# Patient Record
Sex: FEMALE | Race: WHITE | Employment: UNEMPLOYED | URBAN - METROPOLITAN AREA
[De-identification: names, ages, dates, MRNs, and addresses within clinical notes are randomized per-mention and may not be internally consistent; named-entity substitution may affect disease eponyms.]

---

## 2017-02-09 ENCOUNTER — OFFICE VISIT (OUTPATIENT)
Dept: ORTHOPEDIC SURGERY | Age: 52
End: 2017-02-09

## 2017-02-09 VITALS — WEIGHT: 190 LBS | HEIGHT: 66 IN | BODY MASS INDEX: 30.53 KG/M2

## 2017-02-09 DIAGNOSIS — G63 NEUROPATHY ASSOCIATED WITH CANCER (HCC): ICD-10-CM

## 2017-02-09 DIAGNOSIS — C80.1 NEUROPATHY ASSOCIATED WITH CANCER (HCC): ICD-10-CM

## 2017-02-09 DIAGNOSIS — G56.01 CARPAL TUNNEL SYNDROME OF RIGHT WRIST: ICD-10-CM

## 2017-02-09 PROCEDURE — 99243 OFF/OP CNSLTJ NEW/EST LOW 30: CPT | Performed by: ORTHOPAEDIC SURGERY

## 2017-02-09 RX ORDER — AMITRIPTYLINE HYDROCHLORIDE 10 MG/1
TABLET, FILM COATED ORAL
COMMUNITY
Start: 2017-02-06 | End: 2017-03-08

## 2017-02-09 RX ORDER — ANASTROZOLE 1 MG/1
1 TABLET ORAL
COMMUNITY
Start: 2016-12-14 | End: 2017-02-09 | Stop reason: CLARIF

## 2017-02-10 ENCOUNTER — TELEPHONE (OUTPATIENT)
Dept: ORTHOPEDIC SURGERY | Age: 52
End: 2017-02-10

## 2017-02-28 ENCOUNTER — TELEPHONE (OUTPATIENT)
Dept: ORTHOPEDIC SURGERY | Age: 52
End: 2017-02-28

## 2017-03-01 ENCOUNTER — HOSPITAL ENCOUNTER (OUTPATIENT)
Dept: SURGERY | Age: 52
Discharge: HOME | End: 2017-03-01
Attending: ORTHOPAEDIC SURGERY | Admitting: ORTHOPAEDIC SURGERY

## 2017-03-06 ENCOUNTER — TELEPHONE (OUTPATIENT)
Dept: ORTHOPEDIC SURGERY | Age: 52
End: 2017-03-06

## 2017-03-15 ENCOUNTER — HOSPITAL ENCOUNTER (OUTPATIENT)
Dept: SURGERY | Age: 52
Discharge: HOME | End: 2017-03-15
Attending: ORTHOPAEDIC SURGERY | Admitting: ORTHOPAEDIC SURGERY

## 2017-03-15 VITALS
DIASTOLIC BLOOD PRESSURE: 83 MMHG | SYSTOLIC BLOOD PRESSURE: 118 MMHG | TEMPERATURE: 97.8 F | WEIGHT: 190 LBS | HEIGHT: 66 IN | HEART RATE: 69 BPM | BODY MASS INDEX: 30.53 KG/M2 | OXYGEN SATURATION: 98 % | RESPIRATION RATE: 18 BRPM

## 2017-03-15 RX ORDER — SODIUM CHLORIDE, SODIUM LACTATE, POTASSIUM CHLORIDE, CALCIUM CHLORIDE 600; 310; 30; 20 MG/100ML; MG/100ML; MG/100ML; MG/100ML
INJECTION, SOLUTION INTRAVENOUS CONTINUOUS
Status: DISCONTINUED | OUTPATIENT
Start: 2017-03-15 | End: 2017-03-16 | Stop reason: HOSPADM

## 2017-03-15 RX ORDER — DIPHENHYDRAMINE HYDROCHLORIDE 50 MG/ML
12.5 INJECTION INTRAMUSCULAR; INTRAVENOUS
Status: ACTIVE | OUTPATIENT
Start: 2017-03-15 | End: 2017-03-15

## 2017-03-15 RX ORDER — MORPHINE SULFATE 10 MG/ML
2 INJECTION, SOLUTION INTRAMUSCULAR; INTRAVENOUS EVERY 5 MIN PRN
Status: DISCONTINUED | OUTPATIENT
Start: 2017-03-15 | End: 2017-03-16 | Stop reason: HOSPADM

## 2017-03-15 RX ORDER — OXYCODONE HYDROCHLORIDE AND ACETAMINOPHEN 5; 325 MG/1; MG/1
1 TABLET ORAL PRN
Status: ACTIVE | OUTPATIENT
Start: 2017-03-15 | End: 2017-03-15

## 2017-03-15 RX ORDER — ONDANSETRON 2 MG/ML
4 INJECTION INTRAMUSCULAR; INTRAVENOUS ONCE
Status: COMPLETED | OUTPATIENT
Start: 2017-03-15 | End: 2017-03-15

## 2017-03-15 RX ORDER — HYDRALAZINE HYDROCHLORIDE 20 MG/ML
5 INJECTION INTRAMUSCULAR; INTRAVENOUS
Status: DISCONTINUED | OUTPATIENT
Start: 2017-03-15 | End: 2017-03-16 | Stop reason: HOSPADM

## 2017-03-15 RX ORDER — SCOLOPAMINE TRANSDERMAL SYSTEM 1 MG/1
1 PATCH, EXTENDED RELEASE TRANSDERMAL ONCE
Status: DISCONTINUED | OUTPATIENT
Start: 2017-03-15 | End: 2017-03-16 | Stop reason: HOSPADM

## 2017-03-15 RX ORDER — LIDOCAINE HYDROCHLORIDE 10 MG/ML
1 INJECTION, SOLUTION EPIDURAL; INFILTRATION; INTRACAUDAL; PERINEURAL
Status: ACTIVE | OUTPATIENT
Start: 2017-03-15 | End: 2017-03-15

## 2017-03-15 RX ORDER — MEPERIDINE HYDROCHLORIDE 50 MG/ML
12.5 INJECTION INTRAMUSCULAR; INTRAVENOUS; SUBCUTANEOUS EVERY 5 MIN PRN
Status: DISCONTINUED | OUTPATIENT
Start: 2017-03-15 | End: 2017-03-16 | Stop reason: HOSPADM

## 2017-03-15 RX ORDER — OXYCODONE HYDROCHLORIDE AND ACETAMINOPHEN 5; 325 MG/1; MG/1
2 TABLET ORAL PRN
Status: ACTIVE | OUTPATIENT
Start: 2017-03-15 | End: 2017-03-15

## 2017-03-15 RX ORDER — LABETALOL HYDROCHLORIDE 5 MG/ML
5 INJECTION, SOLUTION INTRAVENOUS EVERY 10 MIN PRN
Status: DISCONTINUED | OUTPATIENT
Start: 2017-03-15 | End: 2017-03-16 | Stop reason: HOSPADM

## 2017-03-15 RX ORDER — ONDANSETRON 2 MG/ML
4 INJECTION INTRAMUSCULAR; INTRAVENOUS
Status: ACTIVE | OUTPATIENT
Start: 2017-03-15 | End: 2017-03-15

## 2017-03-15 RX ORDER — HYDROCODONE BITARTRATE AND ACETAMINOPHEN 5; 325 MG/1; MG/1
1 TABLET ORAL EVERY 6 HOURS PRN
Qty: 15 TABLET | Refills: 0 | Status: SHIPPED | OUTPATIENT
Start: 2017-03-15 | End: 2017-03-22

## 2017-03-15 RX ORDER — SCOLOPAMINE TRANSDERMAL SYSTEM 1 MG/1
PATCH, EXTENDED RELEASE TRANSDERMAL
Status: DISCONTINUED
Start: 2017-03-15 | End: 2017-03-16 | Stop reason: HOSPADM

## 2017-03-15 RX ORDER — MORPHINE SULFATE 2 MG/ML
1 INJECTION, SOLUTION INTRAMUSCULAR; INTRAVENOUS EVERY 5 MIN PRN
Status: DISCONTINUED | OUTPATIENT
Start: 2017-03-15 | End: 2017-03-16 | Stop reason: HOSPADM

## 2017-03-15 RX ORDER — ONDANSETRON 2 MG/ML
INJECTION INTRAMUSCULAR; INTRAVENOUS
Status: COMPLETED
Start: 2017-03-15 | End: 2017-03-15

## 2017-03-15 RX ADMIN — ONDANSETRON 4 MG: 2 INJECTION INTRAMUSCULAR; INTRAVENOUS at 07:38

## 2017-03-15 RX ADMIN — SODIUM CHLORIDE, SODIUM LACTATE, POTASSIUM CHLORIDE, CALCIUM CHLORIDE: 600; 310; 30; 20 INJECTION, SOLUTION INTRAVENOUS at 06:54

## 2017-03-15 ASSESSMENT — PAIN SCALES - GENERAL
PAINLEVEL_OUTOF10: 0

## 2017-03-15 ASSESSMENT — PAIN - FUNCTIONAL ASSESSMENT: PAIN_FUNCTIONAL_ASSESSMENT: 0-10

## 2017-03-15 ASSESSMENT — PAIN DESCRIPTION - PAIN TYPE: TYPE: ACUTE PAIN

## 2017-03-24 ENCOUNTER — OFFICE VISIT (OUTPATIENT)
Dept: ORTHOPEDIC SURGERY | Age: 52
End: 2017-03-24

## 2017-03-24 VITALS — BODY MASS INDEX: 30.54 KG/M2 | HEIGHT: 66 IN | WEIGHT: 190.04 LBS

## 2017-03-24 DIAGNOSIS — G56.02 LEFT CARPAL TUNNEL SYNDROME: ICD-10-CM

## 2017-03-24 DIAGNOSIS — G56.01 CARPAL TUNNEL SYNDROME OF RIGHT WRIST: Primary | ICD-10-CM

## 2017-03-24 DIAGNOSIS — M18.12 PRIMARY OSTEOARTHRITIS OF FIRST CARPOMETACARPAL JOINT OF LEFT HAND: ICD-10-CM

## 2017-03-24 PROCEDURE — 99024 POSTOP FOLLOW-UP VISIT: CPT | Performed by: ORTHOPAEDIC SURGERY

## 2017-03-24 PROCEDURE — L3908 WHO COCK-UP NONMOLDE PRE OTS: HCPCS | Performed by: ORTHOPAEDIC SURGERY

## 2017-04-26 ENCOUNTER — TELEPHONE (OUTPATIENT)
Dept: ORTHOPEDIC SURGERY | Age: 52
End: 2017-04-26

## 2017-05-08 ENCOUNTER — TELEPHONE (OUTPATIENT)
Dept: ORTHOPEDIC SURGERY | Age: 52
End: 2017-05-08

## 2017-05-15 ENCOUNTER — TELEPHONE (OUTPATIENT)
Dept: ORTHOPEDIC SURGERY | Age: 52
End: 2017-05-15

## 2017-06-05 ENCOUNTER — TELEPHONE (OUTPATIENT)
Dept: ORTHOPEDIC SURGERY | Age: 52
End: 2017-06-05

## 2017-06-08 ENCOUNTER — OFFICE VISIT (OUTPATIENT)
Dept: ORTHOPEDIC SURGERY | Age: 52
End: 2017-06-08

## 2017-06-08 DIAGNOSIS — G56.02 LEFT CARPAL TUNNEL SYNDROME: Primary | ICD-10-CM

## 2017-06-08 DIAGNOSIS — M18.12 PRIMARY OSTEOARTHRITIS OF FIRST CARPOMETACARPAL JOINT OF LEFT HAND: ICD-10-CM

## 2017-06-08 PROCEDURE — L3908 WHO COCK-UP NONMOLDE PRE OTS: HCPCS | Performed by: ORTHOPAEDIC SURGERY

## 2017-06-08 PROCEDURE — 99024 POSTOP FOLLOW-UP VISIT: CPT | Performed by: ORTHOPAEDIC SURGERY

## 2017-06-12 ENCOUNTER — HOSPITAL ENCOUNTER (OUTPATIENT)
Dept: OCCUPATIONAL THERAPY | Age: 52
Discharge: HOME | End: 2017-06-30
Attending: ORTHOPAEDIC SURGERY | Admitting: ORTHOPAEDIC SURGERY

## 2017-07-01 ENCOUNTER — HOSPITAL ENCOUNTER (OUTPATIENT)
Dept: OTHER | Age: 52
Discharge: HOME | End: 2017-07-31
Attending: ORTHOPAEDIC SURGERY | Admitting: ORTHOPAEDIC SURGERY

## 2017-07-28 ENCOUNTER — TELEPHONE (OUTPATIENT)
Dept: ORTHOPEDIC SURGERY | Age: 52
End: 2017-07-28

## 2017-08-07 ENCOUNTER — HOSPITAL ENCOUNTER (OUTPATIENT)
Dept: PHYSICAL THERAPY | Age: 52
Discharge: HOME | End: 2017-08-31
Attending: NURSE PRACTITIONER | Admitting: NURSE PRACTITIONER

## 2017-08-14 ENCOUNTER — HOSPITAL ENCOUNTER (OUTPATIENT)
Dept: PHYSICAL THERAPY | Age: 52
Discharge: HOME | End: 2017-08-14
Admitting: NURSE PRACTITIONER

## 2017-08-28 ENCOUNTER — HOSPITAL ENCOUNTER (OUTPATIENT)
Dept: PHYSICAL THERAPY | Age: 52
Discharge: HOME | End: 2017-08-28
Admitting: NURSE PRACTITIONER

## 2017-09-18 ENCOUNTER — HOSPITAL ENCOUNTER (OUTPATIENT)
Dept: PHYSICAL THERAPY | Age: 52
Discharge: HOME | End: 2017-09-18
Admitting: NURSE PRACTITIONER

## 2017-10-17 ENCOUNTER — OFFICE VISIT (OUTPATIENT)
Dept: ORTHOPEDIC SURGERY | Age: 52
End: 2017-10-17

## 2017-10-17 VITALS — HEIGHT: 66 IN | BODY MASS INDEX: 30.53 KG/M2 | WEIGHT: 190 LBS

## 2017-10-17 DIAGNOSIS — M65.311 TRIGGER THUMB OF RIGHT HAND: ICD-10-CM

## 2017-10-17 DIAGNOSIS — M79.641 RIGHT HAND PAIN: Primary | ICD-10-CM

## 2017-10-17 DIAGNOSIS — M65.4 DE QUERVAIN'S TENOSYNOVITIS, RIGHT: ICD-10-CM

## 2017-10-17 PROCEDURE — 20550 NJX 1 TENDON SHEATH/LIGAMENT: CPT | Performed by: ORTHOPAEDIC SURGERY

## 2017-10-17 PROCEDURE — 99213 OFFICE O/P EST LOW 20 MIN: CPT | Performed by: ORTHOPAEDIC SURGERY

## 2017-10-17 PROCEDURE — L3809 WHFO W/O JOINTS PRE OTS: HCPCS | Performed by: ORTHOPAEDIC SURGERY

## 2017-10-17 PROCEDURE — 73130 X-RAY EXAM OF HAND: CPT | Performed by: ORTHOPAEDIC SURGERY

## 2017-11-13 ENCOUNTER — TELEPHONE (OUTPATIENT)
Dept: ORTHOPEDIC SURGERY | Age: 52
End: 2017-11-13

## 2017-11-13 NOTE — TELEPHONE ENCOUNTER
WANTED TO LET US KNOW THAT HER WRIST THUMB AND FINGERS ARE FEELING MUCH BETTER. SHE IS GOING TO CANCEL HER APPOINTMENT FOR Thursday AND WILL COME BACK IF NEEDED.

## 2017-11-16 ENCOUNTER — HOSPITAL ENCOUNTER (OUTPATIENT)
Dept: ULTRASOUND IMAGING | Age: 52
Discharge: HOME | End: 2017-11-16

## 2017-11-16 DIAGNOSIS — N63.0 LUMP OR MASS IN BREAST: ICD-10-CM

## 2018-03-02 ENCOUNTER — OFFICE VISIT (OUTPATIENT)
Dept: ORTHOPEDIC SURGERY | Age: 53
End: 2018-03-02

## 2018-03-02 VITALS — WEIGHT: 190.04 LBS | HEIGHT: 66 IN | BODY MASS INDEX: 30.54 KG/M2

## 2018-03-02 DIAGNOSIS — M65.311 TRIGGER THUMB OF RIGHT HAND: ICD-10-CM

## 2018-03-02 DIAGNOSIS — M65.4 DE QUERVAIN'S TENOSYNOVITIS, RIGHT: Primary | ICD-10-CM

## 2018-03-02 PROCEDURE — 20550 NJX 1 TENDON SHEATH/LIGAMENT: CPT | Performed by: ORTHOPAEDIC SURGERY

## 2018-03-02 PROCEDURE — 99213 OFFICE O/P EST LOW 20 MIN: CPT | Performed by: ORTHOPAEDIC SURGERY

## 2018-03-02 NOTE — PROGRESS NOTES
HISTORY OF PRESENT ILLNESS:  The patient returns reports that back in the autumn when I injected her right de Quervain's in her right trigger thumb she had tremendous full resolution of symptoms. However, as she continues on the estrogen suppression medication she has had intermittent catching of multiple digits over the last few months. She even changed her medications and took a break on the meds to see if that would help her hand. She occasionally awakens with locking to the right thumb and even other fingers. PAST MEDICAL HISTORY: Patient's medications, allergies, past medical, surgical, social and family histories were reviewed and updated as appropriate. ROS: Pertinent items are noted in HPI. Review of systems reviewed from patient history form dated on 10/17/2017 and available in the patient's chart under the media tab. PHYSICAL EXAMINATION: Examination reveals a pleasant individual in no acute distress. There appears to be satisfactory pain-free  range of motion, strength,and stability of the cervical spine, shoulders, and elbows. Skin is intact without lymphadenopathy, discoloration, or abnormal temperature. There is intact symmetric circulation in both upper extremities. Hand and digital range of motion is satisfactory bilaterally. Provocative testing for carpal tunnel syndrome is negative. The patient has point tenderness over the A1 pulley of the right thumb with grade 2 triggering. I do not appreciate any other overt triggering to the other digits despite her history. There is tenderness over the radial wrist at the right  first dorsal extensor compartment and a positive Finkelstein's test which reproduces symptoms. DIAGNOSTIC TESTING:        IMPRESSION AND PLAN:  Painful return of right de Quervain's tendinitis and right thumb trigger digit. We talked about surgical and nonsurgical options.   After good discussion, she would like to attempt with at least one more injection to

## 2018-04-11 ENCOUNTER — HOSPITAL ENCOUNTER (OUTPATIENT)
Dept: CT IMAGING | Facility: MEDICAL CENTER | Age: 53
Discharge: HOME | End: 2018-04-11
Attending: INTERNAL MEDICINE | Admitting: INTERNAL MEDICINE

## 2018-04-11 DIAGNOSIS — R91.1 SOLITARY LUNG NODULE: ICD-10-CM

## 2018-04-11 DIAGNOSIS — R91.1 SOLITARY PULMONARY NODULE: ICD-10-CM

## 2018-06-04 ENCOUNTER — HOSPITAL ENCOUNTER (OUTPATIENT)
Dept: ULTRASOUND IMAGING | Age: 53
Discharge: HOME | End: 2018-06-04
Attending: NURSE PRACTITIONER | Admitting: NURSE PRACTITIONER

## 2018-06-04 DIAGNOSIS — C50.411 MALIGNANT NEOPLASM OF UPPER-OUTER QUADRANT OF RIGHT FEMALE BREAST (HCC): ICD-10-CM

## 2018-06-04 DIAGNOSIS — R10.31 ABDOMINAL PAIN, RIGHT LOWER QUADRANT: ICD-10-CM

## 2018-11-15 ENCOUNTER — OFFICE VISIT (OUTPATIENT)
Dept: ORTHOPEDIC SURGERY | Age: 53
End: 2018-11-15
Payer: COMMERCIAL

## 2018-11-15 VITALS — HEIGHT: 66 IN | BODY MASS INDEX: 32.14 KG/M2 | WEIGHT: 200 LBS

## 2018-11-15 DIAGNOSIS — M65.312 TRIGGER THUMB OF LEFT HAND: ICD-10-CM

## 2018-11-15 DIAGNOSIS — M65.311 TRIGGER THUMB OF RIGHT HAND: Primary | ICD-10-CM

## 2018-11-15 PROCEDURE — 99213 OFFICE O/P EST LOW 20 MIN: CPT | Performed by: ORTHOPAEDIC SURGERY

## 2018-11-15 PROCEDURE — 20550 NJX 1 TENDON SHEATH/LIGAMENT: CPT | Performed by: ORTHOPAEDIC SURGERY

## 2018-11-15 NOTE — PROGRESS NOTES
Chief Complaint:  Follow-up (CK DEONTE THUMBS)      History of Present of Illness: The patient returns and reports that when I saw her many months ago she had excellent relief for treatment of her de Quervain's and trigger thumb with injection. However, she continues on the coronal take inhibitors and she now has been experiencing some intermittent numbness and tingling in the right and left long ring and small fingers and intermittent discomfort into both thumbs. She has been wearing carpal tunnel braces without relief. She does have a history of Raudel Marquez and has been awaiting an appointment with her spine team at [facility]. She recently started meloxicam which she feels has been helpful. Review of Systems  Pertinent items are noted in HPI  Denies fever, chills, confusion, bowel/bladder active change. Review of systems reviewed from Patient History Form dated on 11/15/18 and available in the patient's chart under the Media tab. Examination:  On examination today she does have tenderness with some fullness over the A1 pulley of the right thumb. I would grade this as 1-2 trigger thumb. Finkelstein's maneuver is negative and provocative testing for carpal tunnel syndrome is locally tender but it does not quickly reproduce any dramatic discomfort other than exacerbating the existing tingling in the fingers. Provocative testing for cubital tunnel syndrome is negative. All other fingers have full range of motion without obvious triggering. However, she does have tenderness with firm pressure over the A1 pulley of the left thumb and reports to me that she has had some occasional clicking in this area. All fingers are perfused and at baseline sensation.     Radiology:     X-rays obtained and reviewed in office:  Views    Location     Impression      Orders Placed This Encounter   Procedures    MD BETAMETHASONE ACET&SOD PHOSP    MD INJECT TENDON SHEATH/LIGAMENT       Impression:  Encounter Diagnosis

## 2019-01-09 ENCOUNTER — HOSPITAL ENCOUNTER (OUTPATIENT)
Dept: PHYSICAL THERAPY | Age: 54
Setting detail: THERAPIES SERIES
Discharge: HOME | End: 2019-01-09
Payer: COMMERCIAL

## 2019-01-09 PROCEDURE — 97161 PT EVAL LOW COMPLEX 20 MIN: CPT

## 2019-01-09 PROCEDURE — 97140 MANUAL THERAPY 1/> REGIONS: CPT

## 2019-01-09 PROCEDURE — 97110 THERAPEUTIC EXERCISES: CPT

## 2019-01-16 ENCOUNTER — HOSPITAL ENCOUNTER (OUTPATIENT)
Dept: PHYSICAL THERAPY | Age: 54
Setting detail: THERAPIES SERIES
Discharge: HOME | End: 2019-01-16
Payer: COMMERCIAL

## 2019-01-16 PROCEDURE — 97110 THERAPEUTIC EXERCISES: CPT

## 2019-01-16 PROCEDURE — 97140 MANUAL THERAPY 1/> REGIONS: CPT

## 2019-01-22 ENCOUNTER — APPOINTMENT (OUTPATIENT)
Dept: PHYSICAL THERAPY | Age: 54
End: 2019-01-22
Payer: COMMERCIAL

## 2019-01-25 ENCOUNTER — APPOINTMENT (OUTPATIENT)
Dept: PHYSICAL THERAPY | Age: 54
End: 2019-01-25
Payer: COMMERCIAL

## 2019-01-28 ENCOUNTER — HOSPITAL ENCOUNTER (OUTPATIENT)
Dept: PHYSICAL THERAPY | Age: 54
Setting detail: THERAPIES SERIES
Discharge: HOME | End: 2019-01-28
Payer: COMMERCIAL

## 2019-01-30 ENCOUNTER — APPOINTMENT (OUTPATIENT)
Dept: PHYSICAL THERAPY | Age: 54
End: 2019-01-30
Payer: COMMERCIAL

## 2019-06-20 ENCOUNTER — HOSPITAL ENCOUNTER (OUTPATIENT)
Dept: GENERAL RADIOLOGY | Age: 54
Discharge: HOME | End: 2019-06-20
Payer: COMMERCIAL

## 2019-06-20 DIAGNOSIS — Z78.0 ASYMPTOMATIC MENOPAUSAL STATE: ICD-10-CM

## 2019-06-20 PROCEDURE — 77080 DXA BONE DENSITY AXIAL: CPT

## 2019-09-05 ENCOUNTER — HOSPITAL ENCOUNTER (OUTPATIENT)
Dept: PHYSICAL THERAPY | Age: 54
Setting detail: THERAPIES SERIES
Discharge: HOME | End: 2019-09-05
Payer: COMMERCIAL

## 2019-09-05 PROCEDURE — 97110 THERAPEUTIC EXERCISES: CPT

## 2019-09-05 PROCEDURE — 97140 MANUAL THERAPY 1/> REGIONS: CPT

## 2019-09-05 PROCEDURE — 97162 PT EVAL MOD COMPLEX 30 MIN: CPT

## 2019-09-05 ASSESSMENT — PAIN DESCRIPTION - ONSET: ONSET: ON-GOING

## 2019-09-05 ASSESSMENT — PAIN DESCRIPTION - LOCATION: LOCATION: SHOULDER

## 2019-09-05 ASSESSMENT — PAIN DESCRIPTION - FREQUENCY: FREQUENCY: CONTINUOUS

## 2019-09-05 ASSESSMENT — PAIN DESCRIPTION - PROGRESSION: CLINICAL_PROGRESSION: NOT CHANGED

## 2019-09-05 ASSESSMENT — PAIN DESCRIPTION - PAIN TYPE: TYPE: ACUTE PAIN

## 2019-09-05 ASSESSMENT — PAIN DESCRIPTION - DESCRIPTORS: DESCRIPTORS: ACHING

## 2019-09-05 ASSESSMENT — PAIN DESCRIPTION - ORIENTATION: ORIENTATION: RIGHT

## 2019-09-05 NOTE — PROGRESS NOTES
32cm, Elbow at crease 28cm, 3\"BE 27cm, Wrist 16cm, Palm 20cm   Total = 123cm  Total difference= 8cm      Assessment   Body structures, Functions, Activity limitations: Decreased ROM; Decreased strength; Increased Pain  Assessment: 53 yo female referred to OP PT for R shoulder pain and swelling.  PMH Ca R breast with lumpectomy, lymph node disection, chemo and radiation in 20 16  Treatment Diagnosis: RUE lymphedema, R shoulder pain and decreased ROM  Prognosis: Good  Decision Making: Medium Complexity  Barriers to Learning: none, pt voices and demonstrates understanding  Activity Tolerance: Patient Tolerated treatment well   Plan   Times per week: 2 x week for 4 weeks for MLD, ex, compression and education  Current Treatment Recommendations: Strengthening, ROM, Manual Lymphatic Drainage, Safety Education & Training, Home Exercise Program  Goals  Short term goals  Time Frame for Short term goals: 2 weeks  Short term goal 1: Establish HEP  Short term goal 2: Decrease shoulder pain to 1/10 best, 5/10 worst  Short term goal 3: Pt to be indep with lymphedema precautions  Short term goal 4: decrease RUE total girth by 3 cm  Long term goals  Time Frame for Long term goals : 4 weeks  Long term goal 1: Pt to be indep with HEP  Long term goal 2: Decrease shoulder pain to 0/10 best 3-4/10 worst  Long term goal 3: pt to be indep with use of compression garment  Long term goal 4: decrease RUE total girth by 6 cm  Patient Goals   Patient goals : \"decrease pain and swelling R arm\"     Therapy Time   Individual Concurrent Group Co-treatment   Time In 1100         Time Out 1215         Minutes 75         Timed Code Treatment Minutes: [address], [id number]

## 2019-09-05 NOTE — FLOWSHEET NOTE
[address] [facility]  PHONE [phone]  [facility] [place] [phone]    Physical Therapy Daily Treatment Note    Date:  2019    Patient Name:  Smooth Stout    :  1965  MRN: [id number]  Restrictions/Precautions:  None per script, no port  Medical/Treatment Diagnosis Information:  · Diagnosis: R shoulder pain and edema Z85.3, K01.4  Insurance/Certification information:  PT Insurance Information: BCBS  need precert after 29 visits  Physician Information:  Referring Practitioner: Leonel Ortiz MD  Plan of care signed (Y/N):  Eval faxed day of eval  Visit# / total visits:       Time in:   11:00     Timed Treatment: 50 min Total Treatment Time: 75 min  Time out: 12:15  ________________________________________________________________________________________    Pain Level:    4/10 R shoulder and RUE  SUBJECTIVE:  Subjective: Pt reports shoulder pain , stiffness, and mild edema started about 3 weeks prior to seeing Dr. Maria De Jesus Rodriguez. Feels her daily chemo pills make her joints stiff. Pain 1-2/10 at best, 4/10 avg during day, 8/10 worst by end of day. She has never had a compression garment    OBJECTIVE:     Exercise/Equipment Resistance/Repetitions Other comments            Pinky wall slides 10 x B    Shoulder blade squeeze 10 x     Wrist flexion/ext (knock on door) 10 x RUE    Finger ROM (play piano on wall) 10 x RUE                                                                                                           Other Therapeutic Activities: Pt instructed in lymphedema prevention/precautions, use of dermafit for mild compression RUE, self manual drainage of major jan areas, HEP, goals of treatment and plan of care. Pt voiced understanding of all instructions and agreement with plan and goals Time for ex and instruction in home program 40 min    Manual Treatments: Manual lymph drainage of major jan areas at neck, axilla, groin, abdomen.  Application of Size E Dermafit for mild compression. Demonstrated how pt can best do this at home. Time = 10 min        Modalities:      Test/Measurements:    AROM RUE : Flexion 156, abd 180, ER 90, IR 60  Strength RUE: flexion 5, abd 4+, ER 4+, IR 5  Flexibility: Skin Integrity: Good, no fibrotic areas, no color changes, nail beds WNL  RUE GIRTH**: 4\"AE 36cm, Elbow at crease 28.5cm, 3\"BE 28.5, Wrist 16cm, Palm 22   Total =131cm  LUE GIRTH: 4\"AE 32cm, Elbow at crease 28cm, 3\"BE 27cm, Wrist 16cm, Palm 20cm   Total = 123cm  Total difference= 8cm     ASSESSMENT:  Assessment: 51 yo female referred to OP PT for R shoulder pain and swelling. PMH Ca R breast with lumpectomy, lymph node disection, chemo and radiation 2016.          Treatment/Activity Tolerance:   [x]Patient tolerated treatment well [] Patient limited by fatique  []Patient limited by pain [] Patient limited by other medical complications  [] Other:     Goals:    Short term goals  Time Frame for Short term goals: 2 weeks  Short term goal 1: Establish HEP  Short term goal 2: Decrease shoulder pain to 1/10 best, 5/10 worst  Short term goal 3: Pt to be indep with lymphedema precautions  Short term goal 4: decrease RUE total girth by 3 cm     Long term goals  Time Frame for Long term goals : 4 weeks  Long term goal 1: Pt to be indep with HEP  Long term goal 2: Decrease shoulder pain to 0/10 best 3-4/10 worst  Long term goal 3: pt to be indep with use of compression garment  Long term goal 4: decrease RUE total girth by 6 cm     Plan: [] Continue per plan of care [] Alter current plan (see comments)   [x] Plan of care initiated [] Hold pending MD visit [] Discharge      Plan for Next Session:  MLD, there ex, compression, education    Re-Certification Due Date:    10th visit or 17/5/3334  Precert needed post 29 visits     Signature:  Korin Washington , [id number]

## 2019-09-10 ENCOUNTER — HOSPITAL ENCOUNTER (OUTPATIENT)
Dept: PHYSICAL THERAPY | Age: 54
Setting detail: THERAPIES SERIES
Discharge: HOME | End: 2019-09-10
Payer: COMMERCIAL

## 2019-09-10 PROCEDURE — 97140 MANUAL THERAPY 1/> REGIONS: CPT

## 2019-09-10 PROCEDURE — 97110 THERAPEUTIC EXERCISES: CPT

## 2019-09-10 NOTE — FLOWSHEET NOTE
[address]  PHONE [phone]  I [phone]    Physical Therapy Daily Treatment Note    Date:  9/10/2019    Patient Name:  Ugo Gao    :  1965  MRN: [id number]  Restrictions/Precautions:  None per script, no port  Medical/Treatment Diagnosis Information:  · Diagnosis: R shoulder pain and edema Z85.3, G07.8  Insurance/Certification information:  PT Insurance Information: BCBS  need precert after 29 visits  Physician Information:  Referring Practitioner: Luis F Whyte MD  Plan of care signed (Y/N):  Eval faxed day of eval  Visit# / total visits:       Time in:   10:05     Timed Treatment: 50 min Total Treatment Time: 50 min  Time out: 1055  ________________________________________________________________________________________    Pain Level:    3/10 R shoulder and RUE  SUBJECTIVE:  Subjective: Pt reports compliance with HEP and use of tubigrip. Decreased frequency and intensity of pain. Has ordered 2 compression sleeves and one glove which should arrive later today. OBJECTIVE:     Exercise/Equipment Resistance/Repetitions Other comments            Pinky wall slides 10 x B    Shoulder blade squeeze 10 x     Wrist flexion/ext (knock on door) 10 x RUE    Finger ROM (play piano on wall) 10 x RUE      Cervical Rotation 5 x B    Shoulder shrugs 5 x    Shoulder circles 5 x fwd/5 x back    Elbow flexion/ext 5 x B    Wrist flexion, knock on door 5 x    Open/close hand 5 x                                                              Other Therapeutic Activities: reviewed HEP, use of tubigrip, self massage and node drainage. Time for ex and instruction 20 min  Manual Treatments: Manual lymph drainage of major jan areas at neck, axilla, groin, abdomen. MLD RUE following pathway across chest. Application of Size E Dermafit for mild compression. Demonstrated how pt can best do this at home.  Time = 30 min        Modalities:      Test/Measurements:  ( EVAL) Current  AROM RUE : Flexion (156) 162, abd (180) 180 , ER (90) 90, IR (60) 75  Strength RUE: flexion 5, abd 4+, ER 4+, IR 5  Flexibility: Skin Integrity: Good, no fibrotic areas, no color changes, nail beds WNL  RUE GIRTH**: 4\"AE (36) 33cm, Elbow at crease (28.5) 27.5cm, 3\"BE (28.5) 27.5, Wrist (16) 16cm, Palm (22) 21   Total =(131) 125cm  LUE GIRTH: 4\"AE 32cm, Elbow at crease 28cm, 3\"BE 27cm, Wrist 16cm, Palm 20cm   Total = 123cm  Total difference= (8) 2cm     ASSESSMENT:  2nd visit today, Improvement in ROM and girth measurements as noted above. Pt unable to attend 2nd visit this wk.  Next visit in 1 wk    Treatment/Activity Tolerance:   [x]Patient tolerated treatment well [] Patient limited by fatique  []Patient limited by pain [] Patient limited by other medical complications  [] Other:     Goals:    Short term goals  Time Frame for Short term goals: 2 weeks  Short term goal 1: Establish HEP  Short term goal 2: Decrease shoulder pain to 1/10 best, 5/10 worst  Short term goal 3: Pt to be indep with lymphedema precautions  Short term goal 4: decrease RUE total girth by 3 cm     Long term goals  Time Frame for Long term goals : 4 weeks  Long term goal 1: Pt to be indep with HEP  Long term goal 2: Decrease shoulder pain to 0/10 best 3-4/10 worst  Long term goal 3: pt to be indep with use of compression garment  Long term goal 4: decrease RUE total girth by 6 cm     Plan: [x] Continue per plan of care [] Alter current plan (see comments)   [] Plan of care initiated [] Hold pending MD visit [] Discharge      Plan for Next Session:  MLD, there ex, compression, education    Re-Certification Due Date:    10th visit or 43/2/5225  Precert needed post 29 visits     Signature:  Kya Kenney , [id number]

## 2019-09-17 ENCOUNTER — HOSPITAL ENCOUNTER (OUTPATIENT)
Dept: PHYSICAL THERAPY | Age: 54
Setting detail: THERAPIES SERIES
Discharge: HOME | End: 2019-09-17
Payer: COMMERCIAL

## 2019-09-17 PROCEDURE — 97110 THERAPEUTIC EXERCISES: CPT

## 2019-09-17 PROCEDURE — 97140 MANUAL THERAPY 1/> REGIONS: CPT

## 2019-10-01 ENCOUNTER — APPOINTMENT (OUTPATIENT)
Dept: PHYSICAL THERAPY | Age: 54
End: 2019-10-01
Payer: COMMERCIAL

## 2019-10-08 ENCOUNTER — APPOINTMENT (OUTPATIENT)
Dept: PHYSICAL THERAPY | Age: 54
End: 2019-10-08
Payer: COMMERCIAL

## 2019-10-08 ENCOUNTER — HOSPITAL ENCOUNTER (OUTPATIENT)
Dept: PHYSICAL THERAPY | Age: 54
Setting detail: THERAPIES SERIES
Discharge: HOME | End: 2019-10-08
Payer: COMMERCIAL

## 2019-10-08 PROCEDURE — 97140 MANUAL THERAPY 1/> REGIONS: CPT

## 2019-10-15 ENCOUNTER — HOSPITAL ENCOUNTER (OUTPATIENT)
Dept: PHYSICAL THERAPY | Age: 54
Setting detail: THERAPIES SERIES
Discharge: HOME | End: 2019-10-15
Payer: COMMERCIAL

## 2019-10-15 PROCEDURE — G0283 ELEC STIM OTHER THAN WOUND: HCPCS

## 2019-10-15 PROCEDURE — 97140 MANUAL THERAPY 1/> REGIONS: CPT

## 2019-10-18 ENCOUNTER — OFFICE VISIT (OUTPATIENT)
Dept: ORTHOPEDIC SURGERY | Age: 54
End: 2019-10-18
Payer: COMMERCIAL

## 2019-10-18 VITALS — BODY MASS INDEX: 32.14 KG/M2 | WEIGHT: 199.96 LBS | HEIGHT: 66 IN

## 2019-10-18 DIAGNOSIS — M25.511 RIGHT SHOULDER PAIN, UNSPECIFIED CHRONICITY: Primary | ICD-10-CM

## 2019-10-18 DIAGNOSIS — M75.41 IMPINGEMENT SYNDROME OF RIGHT SHOULDER: ICD-10-CM

## 2019-10-18 PROCEDURE — 99213 OFFICE O/P EST LOW 20 MIN: CPT | Performed by: ORTHOPAEDIC SURGERY

## 2022-03-17 ENCOUNTER — HOSPITAL ENCOUNTER (OUTPATIENT)
Dept: MAMMOGRAPHY | Age: 57
Discharge: HOME | End: 2022-03-17
Payer: MEDICARE

## 2022-03-17 VITALS — WEIGHT: 200 LBS | BODY MASS INDEX: 32.14 KG/M2 | HEIGHT: 66 IN

## 2022-03-17 DIAGNOSIS — Z12.31 VISIT FOR SCREENING MAMMOGRAM: ICD-10-CM

## 2022-03-17 PROCEDURE — 77063 BREAST TOMOSYNTHESIS BI: CPT

## 2022-05-23 ENCOUNTER — HOSPITAL ENCOUNTER (OUTPATIENT)
Dept: PHYSICAL THERAPY | Age: 57
Setting detail: THERAPIES SERIES
Discharge: HOME | End: 2022-05-23
Payer: MEDICARE

## 2022-05-23 PROCEDURE — 97110 THERAPEUTIC EXERCISES: CPT

## 2022-05-23 PROCEDURE — 97530 THERAPEUTIC ACTIVITIES: CPT

## 2022-05-23 PROCEDURE — 97161 PT EVAL LOW COMPLEX 20 MIN: CPT

## 2022-05-23 PROCEDURE — 97140 MANUAL THERAPY 1/> REGIONS: CPT

## 2022-05-23 NOTE — PROGRESS NOTES
[address] [facility], [facility], [address]  Phone: [phone]                                                    Physical Therapy Certification    We had the pleasure of evaluating the following patient for physical therapy services at [address] [place] [address]. A summary of our findings can be found in the initial assessment below. This includes our plan of care. If you have any questions or concerns regarding these findings, please do not hesitate to contact me at the office phone number checked above. Thank you for the referral.       Physician Signature:_______________________________Date:__________________  By signing above (or electronic signature), therapists plan is approved by physician    LUMBOSACRAL PHYSICAL THERAPY EVALUATION    Patient: Jose Hill   : 1965   MRN: [id number]       Medical Diagnosis:  Back pain       ICD 10:  M54.9    Treatment Diagnosis:  Back pain and radicular leg pain    Onset Date:   Dec 2021     Referral Date:22      Referring Physician:  Dr. Catie Fuentes        Visits Allowed/Insurance/Certification Information:  Medicare and  for life    Precautions/ Contra-indications/Relevant Medical History:    C-SSRS Triggered by Intake questionnaire (Past 2 wk assessment):   [x] No, Questionnaire did not trigger screening.   [] Yes, Patient intake triggered further evaluation      [] C-SSRS Screening completed  [] PCP notified via Plan of Care  [] Emergency services notified     Pt's Occupation/Job Duties:   retired      Social support/Environment:   Lives in ranch home w/ her . Has 1-2 steps    Health History reviewed with pt:  Arnold chiari malformation- cervical, spina bifida oculta L5-S1. Hx of breat CA. hoshimoto syndrome and EDS . Hx of bilat ankle fx during her chemo treatment.   On the R ankle she had 3 fx( tibia,fibula, and talus) with surgery, the L ankle had 2 fx, no surgery. Possible reasons for R ankle weakness    SUBJECTIVE FINDINGS       History of Present Illness:  5/23/22 states she has had episodes of her back going out for 8 years, typically only 1 episode a years,  But since Dec she has had worse. She has had 6 episodes since mid April 2022. She has fallen 3x since mid dec.when her legs just gave out. No tripping on obstacles. She has had recent MRIs of her LB and brain. The chiari malformation is about the same. Considerable deg changes in her LB but not stated as spina bifida occulta. That she has been dx with in the past. Over the winter she has been exercising in her pool and also has an Electrical asst  Bike that she rides 3x wk for 45 min. Other jt problems: R shoulder, L thumb, L hip, and R knee. At times her legs just give out. She has intermit R groin and ant thigh tingling. She is not able to go shopping or be on her feet at any time. Pain       Patient describes pain to be  Constant LBP  Some towards L SI jt, but sometimes toward the R,. Patient reports    On normal days   2 /10 pain at rest, 7 /10 pain with activity/standing for 1 min. Worsened by  - stand/walk, sitting too low, or sitting with straight up posture and a good lordosis, pivot may make her back go out. Improved by - flexion, limited time on her feet. Pt. reports pain with coughing, sneezing and laughing:  Yes with coughing- she has to tighten up all her muscles with it  Pt. reports bowel and bladder changes:   NO   Current Functional Limitations: limited in all aspect of home mangement and social activities. PLOF:  ;limited like this for 1-2 years and progressively getting worse  Pt's sleep is affected? Yes. Can't lay on L hip or R shoulder do to those joints, then her LB issues. Patient goal for therapy: try to stabilize my LB somehow. OBJECTIVE FINDINGS       Posture         Standing pelvic landmarks: level.      Gait/Steps/Balance       Deviations on a level Palpation     Patient reported tenderness   Noted warmth   Noted increased muscle tone   Ligaments     Special Tests     Lumbar Special Test Findings SI Special Test Findings   Straight Leg Raise  Sit up Test/Long sit test    Crams  90/90 Test     Dural Tension/Slump test  Oscillation    Distraction  Ant/Post Rot Prov    Compression  SI distraction      SI compression      Prone knee bend test      Sacral thrust tests      Co-morbidities/Complexities (which will affect course of rehabilitation):  []None           Arthritic conditions   []Rheumatoid arthritis (M05.9)  [x]Osteoarthritis (M19.91)   Cardiovascular conditions   []Hypertension (I10)  []Hyperlipidemia (E78.5)  []Angina pectoris (I20)  []Atherosclerosis (I70)   Musculoskeletal conditions   [x]Disc pathology   [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   []Prior surgical intervention  []Osteoporosis (M81.8)  []Osteopenia (M85.8)   Endocrine conditions   [x]Hypothyroid (E03.9)  []Hyperthyroid Gastrointestinal conditions   []Constipation (D97.48)   Metabolic conditions   []Morbid obesity (E66.01)  []Diabetes type 1(E10.65) or 2 (E11.65)   []Neuropathy (G60.9)     Pulmonary conditions   []Asthma (J45)  []Coughing   []COPD (J44.9)   Psychological Disorders  []Anxiety (F41.9)  []Depression (F32.9)   []Other:   [x]Other: spina bifida occulta, arnold chiari malformation, hoshimoto syndrome, EDS         Specific Mobility Testing     Lumbar:         Other:          FOTO:   eval 32   Predicted 46     ASSESSMENT: unable to complete her assessment due to time constraints. She has been exercising in the pool and on land to work her core/LB/ and LE strength but she is worsening in the past 5 months. She has gradually become more and more limited. She has many conditions that complicate her spine picture including the arnold chiari, spina bifida occulta, and EDS. Will continue with objective testig on her NV.     Functional Impairments:     []Noted lumbar/proximal hip/LE joint hypomobility   []Decreased LE functional ROM   []Decreased core/proximal hip strength and neuromuscular control   []Decreased LE functional strength   []Reduced balance/proprioceptive control   []other:      Functional Activity Limitations (from functional questionnaire and intake)   []Reduced ability to tolerate prolonged functional positions   []Reduced ability or difficulty with changes of positions or transfers between positions   []Reduced ability to maintain good posture and demonstrate good body mechanics with sitting, bending, and lifting   []Reduced ability to sleep   [] Reduced ability or tolerance with driving and/or computer work   []Reduced ability to perform lifting, carrying tasks   []Reduced ability to squat   []Reduced ability to forward bend   []Reduced ability to ambulate prolonged functional periods/distances/surfaces   []Reduced ability to ascend/descend stairs   []Reduced ability to run, hop, cut or jump   []other:    Participation Restrictions   []Reduced participation in self care activities   []Reduced participation in home management activities   []Reduced participation in work activities   []Reduced participation in social activities. []Reduced participation in sport/recreation activities. Classification :    []Signs/symptoms consistent with Lumbar instability/stabilization subgroup. []Signs/symptoms consistent with Lumbar mobilization/manipulation subgroup, myotomes and dermatomes intact. Meets manipulation criteria.     []Signs/symptoms consistent with Lumbar direction specific/centralization subgroup   []Signs/symptoms consistent with Lumbar traction subgroup     []Signs/symptoms consistent with lumbar facet dysfunction   []Signs/symptoms consistent with lumbar stenosis type dysfunction   []Signs/symptoms consistent with nerve root involvement including myotome & dermatome dysfunction   []Signs/symptoms consistent with post-surgical status including: decreased ROM, strength and function. []signs/symptoms consistent with pathology which may benefit from Dry needling      Rehabilitation Potential:  Good for goals listed below. Strengths for achieving goals include:    Limitations for achieving goals include:      Prognosis: []    Good []    Fair []    Poor    Short Term Goals: 1 month  1. Independent in HEP and progression per patient tolerance, in order to prevent re-injury. [] Progressing: [] Met: [] Not Met: [] Adjusted   2. Patient will have a decrease in pain to facilitate improvement in movement, function, and ADLs as indicated by Functional Deficits. [] Progressing: [] Met: [] Not Met: [] Adjusted     Long Term Goals: 2-3 months  1. Improve foto score to 46 to assist with reaching prior level of function. [] Progressing: [] Met: [] Not Met: [] Adjusted   2. Patient will demonstrate increased AROM  : improved trunk AROM by 20% to allow for proper joint functioning as indicated by patients Functional Deficits. [] Progressing: [] Met: [] Not Met: [] Adjusted   3. Patient will demonstrate an increase in strength to  4 to 4+  to allow for proper functional mobility as indicated by patients Functional Deficits. [] Progressing: [] Met: [] Not Met: [] Adjusted   4. Patient will return to all transfers, work activities, and functional activities without increased symptoms or restriction. [] Progressing: [] Met: [] Not Met: [] Adjusted   5. Patient will have 0-3/10 pain with ADL's.  [] Progressing: [] Met: [] Not Met: [] Adjusted   6.  Patient stated goal:  Get stronger and keep her LB from \"going out\"  [] Progressing: [] Met: [] Not Met: [] Adjusted     PLAN OF CARE     To see patient  1-2 x/week for 4-12  weeks for the following treatment interventions:     Therapeutic Exercise   Progressive Resistive Exercise   Modalities of Choice (Heat/Cold/US/EStim/Ionto)   Home Exercise Program   Manual Techniques/Mobilization   Postural Reeducation    Physical Therapy Evaluation Complexity Justification  [x] EVAL (LOW) 82985 (typically 20 minutes face-to-face)  [] EVAL (MOD) 29141 (typically 30 minutes face-to-face)  [] EVAL (HIGH) 30759 (typically 45 minutes face-to-face)  [] RE-EVAL     Thank you for the referral of this patient.       Timed Code Treatment Minutes:   45  minutes      Total Treatment Time:   [address], [address]

## 2022-05-23 NOTE — PROGRESS NOTES
[address] [place] [address] and [facility] [address]  Phone [phone]                                                [x] Daily Treatment Note   [] Progress Note   [] Discharge Note      Date:  2022    Patient Name:  Lynwood Holter        :  1965                            Medical Diagnosis:  Back pain                                        ICD 10:  M54.9     Treatment Diagnosis:  Back pain and radicular leg pain    M54.50     Onset Date:    Dec 2021              Referral Date:22                   Referring Physician:  Dr. Donald Rock                                                    Visits Allowed/Insurance/Certification Information:  Medicare and  for life      Plan of care signed (Y/N):      Progress report will be due (10 Rx or 30 days whichever is less):  3/89/91    Recertification will be due (POC Duration  / 90 days whichever is less):  22    Visit# / total visits:   Visit # Insurance Allowable Auth Required   In Person  1  medicare and tricar for life []  Yes     []  No    Tele Health 0  []  Yes     []  No    Total  1       Latex Allergy:  [x]NO      []YES    Pain level: /10     Subjective:   22 states she has had episodes of her back going out for 8 years, typically only 1 episode a years,  But since Dec she has had worse. She has had 6 episodes since . She has fallen 3x since mid dec.when her legs just gave out. No tripping on obstacles. She has had recent MRIs of her LB and brain. The chiari malformation is about the same. Considerable deg changes in her LB but not stated as spina bifida occulta. That she has been dx with in the past. Over the winter she has been exercising in her pool and also has an Electrical asst  Bike that she rides 3x wk for 45 min. Other jt problems: R shoulder, L thumb, L hip, and R knee. At times her legs just give out.   She has intermit R groin and ant thigh tingling. She is not able to go shopping or be on her feet at any time.     Pain       Patient describes pain to be  Constant LBP  Some towards L SI jt, but sometimes toward the R,. Patient reports    On normal days   2 /10 pain at rest, 7 /10 pain with activity/standing for 1 min. Worsened by  - stand/walk, sitting too low, or sitting with straight up posture and a good lordosis, pivot may make her back go out. Improved by - flexion, limited time on her feet. Pt. reports pain with coughing, sneezing and laughing:  Yes with coughing- she has to tighten up all her muscles with it  Pt. reports bowel and bladder changes:   NO   Current Functional Limitations: limited in all aspect of home mangement and social activities. PLOF:  ;limited like this for 1-2 years and progressively getting worse  Pt's sleep is affected? Yes. Can't lay on L hip or R shoulder do to those joints, then her LB issues.      Patient goal for therapy: try to stabilize my LB somehow. Cardona      Exercises:   Exercise/Equipment Resistance/Repetitions Other comments   5/23/22 explailned course and role of PT     Discussed current pool/land ex. Also using her electrical asst bike. She will continue    Will look into a LB support x    educ for results of cerv/head,a d lumbar MRI x                                                                                    Therapeutic Exercise:15 min       Group Therapy:      Home Exercise Program:      Therapeutic Activity:  15 min.  educ for 2 MRIs    Neuromuscular Re-education:      Gait:      Manual Therapy:  15 min  Cont obj testing.     Canalith Repositioning Procedure:       Modalities:      Charges:  Timed Code Treatment Minutes:  45   Total Treatment Minutes:   60   BWC time for each procedure?:  TE TIME:  NMR TIME:  MANUAL TIME:  UNTIMED MINUTES:          [x] EVAL (LOW) 72775 (typically 20 minutes face-to-face)  [] EVAL (MOD) 65696 (typically 30 minutes face-to-face)  [] EVAL (HIGH) 86276 (typically 45 minutes face-to-face)  [] RE-EVAL     [x] AO(27233) x     [] IONTO  [] NMR (53560) x     [] VASO  [x] Manual (41990) x     [] Other:  [x] TA x      [] Mech Traction (00192)  [] ES(attended) (41205)     [] ES (un) (12937): Medicare Cap Total YTD:  250.00    Treatment/Activity Tolerance:    [] Patient tolerated treatment well [] Patient limited by fatigue   [] Patient limited by pain [] Patient limited by other medical complications   [] Other:     Prognosis: [x] Good [x] Fair  [] Poor    Patient Requires Follow-up:  [x] Yes  [] No    Plan: [] Continue per plan of care [] Alter current plan (see comments)   [x] Plan of care initiated [] Hold pending MD visit [] Discharge    Plan for Next Session:  Cont with eval. Select LBP support, advance HEP    See Progress Note: [x] Yes  [] No       Next due:         Electronically signed by:  Jeri Liang, [id number]  [facility]      Note: If patient does not return for scheduled/ recommended follow up visits, this note will serve as a discharge from care along with most recent update on progress.            Outpatient Physical Therapy  Progress Note      Progress Note covers period from:   5/23/22 To       Subjective:           Objective:   Observation:   Test measurements:       Functional Outcome Measure:            Assessment:   Summary:    Patient's response to treatment:      Goals:  · Progress toward previous goals:      Plan:  ·     Electronically Signed by:

## 2022-06-01 ENCOUNTER — APPOINTMENT (OUTPATIENT)
Dept: PHYSICAL THERAPY | Age: 57
End: 2022-06-01
Payer: MEDICARE

## 2022-06-06 ENCOUNTER — APPOINTMENT (OUTPATIENT)
Dept: PHYSICAL THERAPY | Age: 57
End: 2022-06-06
Payer: MEDICARE

## 2022-06-08 ENCOUNTER — HOSPITAL ENCOUNTER (OUTPATIENT)
Dept: PHYSICAL THERAPY | Age: 57
Setting detail: THERAPIES SERIES
Discharge: HOME | End: 2022-06-08
Payer: MEDICARE

## 2022-06-08 PROCEDURE — 97140 MANUAL THERAPY 1/> REGIONS: CPT

## 2022-06-08 PROCEDURE — 97110 THERAPEUTIC EXERCISES: CPT

## 2022-06-08 NOTE — PROGRESS NOTES
[address] and [facility], [facility], [address]  Phone: [phone]                                                    Physical Therapy Certification    We had the pleasure of evaluating the following patient for physical therapy services at [address] [place] [address]. A summary of our findings can be found in the initial assessment below. This includes our plan of care. If you have any questions or concerns regarding these findings, please do not hesitate to contact me at the office phone number checked above. Thank you for the referral.       Physician Signature:_______________________________Date:__________________  By signing above (or electronic signature), therapists plan is approved by physician    LUMBOSACRAL PHYSICAL THERAPY EVALUATION    Patient: Rhiannon Patel   : 1965   MRN: [id number]       Medical Diagnosis:  Back pain              ( today 22 is a continuationof her LB eval and treatment)       ICD 10:  M54.9    Treatment Diagnosis:  Back pain and radicular leg pain    Onset Date:   Dec 2021     Referral Date:22      Referring Physician:  Dr. Oscar Osman        Visits Allowed/Insurance/Certification Information:  Medicare and  for life    Precautions/ Contra-indications/Relevant Medical History:    C-SSRS Triggered by Intake questionnaire (Past 2 wk assessment):   [x] No, Questionnaire did not trigger screening.   [] Yes, Patient intake triggered further evaluation      [] C-SSRS Screening completed  [] PCP notified via Plan of Care  [] Emergency services notified     Pt's Occupation/Job Duties:   retired      Social support/Environment:   Lives in ranch home w/ her . Has 1-2 steps    Health History reviewed with pt:  Arnold chiari malformation- cervical, spina bifida oculta L5-S1. Hx of breat CA. hoshimoto syndrome and EDS . Hx of bilat ankle fx during her chemo treatment.   On the R ankle she had 3 fx( tibia,fibula, and talus) with surgery, the L ankle had 2 fx, no surgery. Possible reasons for R ankle weakness,  fibromyalgia    SUBJECTIVE FINDINGS       History of Present Illness:  5/23/22 states she has had episodes of her back going out for 8 years, typically only 1 episode a years,  But since Dec she has had worse. She has had 6 episodes since mid April 2022. She has fallen 3x since mid dec.when her legs just gave out. No tripping on obstacles. She has had recent MRIs of her LB and brain. The chiari malformation is about the same. Considerable deg changes in her LB but not stated as spina bifida occulta. That she has been dx with in the past. Over the winter she has been exercising in her pool and also has an Electrical asst  Bike that she rides 3x wk for 45 min. Other jt problems: R shoulder, L thumb, L hip, and R knee. At times her legs just give out. She has intermit R groin and ant thigh tingling. She is not able to go shopping or be on her feet at any time. Pain       Patient describes pain to be  Constant LBP  Some towards L SI jt, but sometimes toward the R,. Patient reports    On normal days   2 /10 pain at rest, 7 /10 pain with activity/standing for 1 min. Worsened by  - stand/walk, sitting too low, or sitting with straight up posture and a good lordosis, pivot may make her back go out. Improved by - flexion, limited time on her feet. Pt. reports pain with coughing, sneezing and laughing:  Yes with coughing- she has to tighten up all her muscles with it  Pt. reports bowel and bladder changes:   NO   Current Functional Limitations: limited in all aspect of home mangement and social activities. PLOF:  ;limited like this for 1-2 years and progressively getting worse  Pt's sleep is affected? Yes. Can't lay on L hip or R shoulder do to those joints, then her LB issues. Patient goal for therapy: try to stabilize my LB somehow.     OBJECTIVE FINDINGS       Posture Standing pelvic landmarks: level. Gait/Steps/Balance       Deviations on a level linoleum surface include : ambulates with walking stick/staff. She uses that to lean on when she has to stand for any length of time. Quick Tests/Functional Myotome Tests:   ( hx of bilat ankle fx/surgery during chemo rx). Possible lingering weakness on the R.  Unilateral sit to stand (upper lumbar):   Heel Walk (L4): weakness   Toe Walk (S1): weakness             SI Tests- standing:  March Test [facility] Test):neg. On 6/8/22 decr post movement bilat. Cigarette Butt Test:  Standing Flexion Test: small + L. On 6/8/22 overtake present on bilat SI jts. Sacral Sulci Tests: N    Lumbar Range of Motion/Strength Testing     ROM (*denotes pain) AROM PROM MMT/RESISTED  COMMENTS   Flexion decr 50%*                        Extension decr 100%      Sidebending Left decr 50%*      Sidebending Right decr 50%*      Rotation Left  decr 50%*   [x]   Seated  []   Standing       Rotation Right decr 50%*    [x]   Seated  []   Standing         SI/Hip Tests- seated:    Seated pelvic landmarks: level  Sitting Flexion Test:  + bilat  Hip PROM IR/ER:  Wnl.     Weakness bilat    Sensation/Motor Function   [x] All dermatomes WNL except as marked below   [x] All  myotomes WNL except as marked below      Dermatome Left Right Myotome Left Right   Anterior groin, 2-3 inches below ASIS (L1-L2)   Hip Flexion (L1-L2)    /5    /5   Middle third anterior thigh (L3)   Hip adduction (L3)    /5    /5   Patella and med malleolus (L4)   Knee extension (L3,4)  3+  /5    4-/5   Fibular head and dorsum of foot (L5)   Ankle dorsiflexion (L4)  4 /5   3+ /5   Lateral side and plantar surface of foot (S1)   Hip abduction (L5)    /5    /5   Medial aspect of posterior thigh (S2)   Great toe extension (L5)   4- /5   5 /5   Perianal area (S3,4)   Knee flexion (L5,S1) 5 5   decr bottom of both feet- hx of chemo, 2 fx ankles.  x x Ankle plantarflexion (S1,2)    /5    /5 Comments:      Reflexes/Trunk Strength     Reflex Left Right Strength Strength   Quadriceps (L3,4) 2+ 2+ Trunk Extensors 3+   Achilles (S1,2) 2+ 2+ Gluteals 3+ bilat   Biceps (C5,6)   Abdominals 3+   Brachioradialis (C6)   IR  L 3+   R 4   Triceps (C7)   ER L 3+      R4   Jazmyne    Hip abd L 3/5      R 3+ /5   Ankle clonus   Hip add L 3+/5    R 4-/5   Pheasant Test           Flexibility     Obers:         Hip flexors/Jay:    Hamstrings:   wnl       Gastrocs:  wnl  Other:      Palpation     Patient reported tenderness   Noted warmth   Noted increased muscle tone   Ligaments     Special Tests     Lumbar Special Test Findings SI Special Test Findings   Straight Leg Raise neg Sit up Test/Long sit test    Crams neg 90/90 Test     Dural Tension/Slump test  Oscillation    Distraction Only very light force is helpful.  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   []Prior surgical intervention  []Osteoporosis (M81.8)  []Osteopenia (M85.8)   Endocrine conditions   [x]Hypothyroid (E03.9)  []Hyperthyroid Gastrointestinal conditions   []Constipation (Y31.57)   Metabolic conditions   []Morbid obesity (E66.01)  []Diabetes type 1(E10.65) or 2 (E11.65)   []Neuropathy (G60.9)     Pulmonary conditions   []Asthma (J45)  []Coughing   []COPD (J44.9)   Psychological Disorders  []Anxiety (F41.9)  []Depression (F32.9)   []Other:   [x]Other: spina bifida occulta, arnold chiari malformation, hoshimoto syndrome, EDS, fibromyalgia Specific Mobility Testing     Lumbar:         Other:          FOTO:   efraín 32   Predicted 46     ASSESSMENT:   She has been exercising in the pool and on land to work her core/LB/ and LE strength but she is worsening in the past 5 months. She has gradually become more and more limited. She presents with bilat SI jt hypermobility and L4-5 S1 hypermobility. Her hips and core strength is very weak despite her best efforts. She has many conditions that complicate her spine picture including the arnold chiari, spina bifida occulta, and EDS. Will continue PT 1-2 x wk antonio treating her with instability, hypermobilie SI jts, and considerable core weakness. I do also believe she has some neurological L LE weakness. Will consider SI belt.     Functional Impairments:     []Noted lumbar/proximal hip/LE joint hypermobility   []Decreased LE functional ROM   [x]Decreased core/proximal hip strength and neuromuscular control   [x]Decreased LE functional strength   [x]Reduced balance/proprioceptive control   []other:      Functional Activity Limitations (from functional questionnaire and intake)   [x]Reduced ability to tolerate prolonged functional positions   [x]Reduced ability or difficulty with changes of positions or transfers between positions   [x]Reduced ability to maintain good posture and demonstrate good body mechanics with sitting, bending, and lifting   [x]Reduced ability to sleep   [] Reduced ability or tolerance with driving and/or computer work   [x]Reduced ability to perform lifting, carrying tasks   [x]Reduced ability to squat   [x]Reduced ability to forward bend   [x]Reduced ability to ambulate prolonged functional periods/distances/surfaces   [x]Reduced ability to ascend/descend stairs   [x]Reduced ability to run, hop, cut or jump   []other:    Participation Restrictions   []Reduced participation in self care activities   [x]Reduced participation in home management activities   [x]Reduced participation in work activities   [x]Reduced participation in social activities. [x]Reduced participation in sport/recreation activities. Classification :    [x]Signs/symptoms consistent with Lumbar instability/stabilization subgroup. []Signs/symptoms consistent with Lumbar mobilization/manipulation subgroup, myotomes and dermatomes intact. Meets manipulation criteria. []Signs/symptoms consistent with Lumbar direction specific/centralization subgroup   []Signs/symptoms consistent with Lumbar traction subgroup     []Signs/symptoms consistent with lumbar facet dysfunction   [x]Signs/symptoms consistent with lumbar stenosis type dysfunction   []Signs/symptoms consistent with nerve root involvement including myotome & dermatome dysfunction   []Signs/symptoms consistent with post-surgical status including: decreased ROM, strength and function. []signs/symptoms consistent with pathology which may benefit from Dry needling      Rehabilitation Potential:  Good for goals listed below. Strengths for achieving goals include: Motivated  Limitations for achieving goals include: Many contributing dx. Prognosis: [x]    Good [x]    Fair []    Poor    Short Term Goals: 1 month  1. Independent in HEP and progression per patient tolerance, in order to prevent re-injury. [] Progressing: [] Met: [] Not Met: [] Adjusted   2. Patient will have a decrease in pain( 20%) to facilitate improvement in movement, function, and ADLs as indicated by Functional Deficits. [] Progressing: [] Met: [] Not Met: [] Adjusted     Long Term Goals: 2-3 months  1. Improve foto score to 46 to assist with reaching prior level of function. [] Progressing: [] Met: [] Not Met: [] Adjusted   2. Patient will demonstrate increased AROM  : improved trunk AROM by 20% to allow for proper joint functioning as indicated by patients Functional Deficits. [] Progressing: [] Met: [] Not Met: [] Adjusted   3.  Patient will demonstrate an increase in strength to  4 to 4+  to allow for proper functional mobility as indicated by patients Functional Deficits. [] Progressing: [] Met: [] Not Met: [] Adjusted   4. Patient will return to all transfers, work activities, and functional activities without increased symptoms or restriction. [] Progressing: [] Met: [] Not Met: [] Adjusted   5. Patient will have 0-3/10 pain with ADL's.  [] Progressing: [] Met: [] Not Met: [] Adjusted   6. Patient stated goal:  Get stronger and keep her LB from \"going out\"  [] Progressing: [] Met: [] Not Met: [] Adjusted     PLAN OF CARE     To see patient  1-2 x/week for 4-12  weeks for the following treatment interventions:     Therapeutic Exercise   Progressive Resistive Exercise   Modalities of Choice (Heat/Cold/US/EStim/Ionto)   Home Exercise Program   Manual Techniques/Mobilization   Postural Reeducation    Physical Therapy Evaluation Complexity Justification  [] EVAL (LOW) 16447 (typically 20 minutes face-to-face)  [] EVAL (MOD) 09115 (typically 30 minutes face-to-face)  [] EVAL (HIGH) 72573 (typically 45 minutes face-to-face)  [] RE-EVAL     Thank you for the referral of this patient.       Timed Code Treatment Minutes:    60  minutes      Total Treatment Time:   [address], [address]

## 2022-06-08 NOTE — FLOWSHEET NOTE
[address] [facility], [facility] [address]  Phone [phone]                                                [x] Daily Treatment Note   [] Progress Note   [] Discharge Note      Date:  2022    Patient Name:  Loretta Deutsch        :  1965                            Medical Diagnosis:  Back pain                                        ICD 10:  M54.9     Treatment Diagnosis:  Back pain and radicular leg pain    M54.50     Onset Date:    Dec 2021              Referral Date:22                   Referring Physician:  Dr. Kalani Funk                                                    Visits Allowed/Insurance/Certification Information:  Medicare and  for life      Plan of care signed (Y/N):      Progress report will be due (10 Rx or 30 days whichever is less):      Recertification will be due (POC Duration  / 90 days whichever is less):  22    Visit# / total visits:   Visit # Insurance Allowable Auth Required   In Person  2  medicare and tricar for life []  Yes     []  No    [facility] 0  []  Yes     []  No    Total  1       Latex Allergy:  [x]NO      []YES    Pain level: /10     Subjective:  22 conts with the same complaints. 22 states she has had episodes of her back going out for 8 years, typically only 1 episode a years,  But since Dec she has had worse. She has had 6 episodes since . She has fallen 3x since mid dec.when her legs just gave out. No tripping on obstacles. She has had recent MRIs of her LB and brain. The chiari malformation is about the same. Considerable deg changes in her LB but not stated as spina bifida occulta. That she has been dx with in the past. Over the winter she has been exercising in her pool and also has an Electrical asst  Bike that she rides 3x wk for 45 min. Other jt problems: R shoulder, L thumb, L hip, and R knee. At times her legs just give out. She has intermit R groin and ant thigh tingling. She is not able to go shopping or be on her feet at any time.     Pain       Patient describes pain to be  Constant LBP  Some towards L SI jt, but sometimes toward the R,. Patient reports    On normal days   2 /10 pain at rest, 7 /10 pain with activity/standing for 1 min. Worsened by  - stand/walk, sitting too low, or sitting with straight up posture and a good lordosis, pivot may make her back go out. Improved by - flexion, limited time on her feet. Pt. reports pain with coughing, sneezing and laughing:  Yes with coughing- she has to tighten up all her muscles with it  Pt. reports bowel and bladder changes:   NO   Current Functional Limitations: limited in all aspect of home mangement and social activities. PLOF:  ;limited like this for 1-2 years and progressively getting worse  Pt's sleep is affected? Yes. Can't lay on L hip or R shoulder do to those joints, then her LB issues.      Patient goal for therapy: try to stabilize my LB somehow.     Next visit:  Palpation ov lumbar spine in prone over pillows, check HEP, start ex on big ball, abdominals w/ red ball.     Exercises:   Exercise/Equipment Resistance/Repetitions Other comments   5/23/22 explailned course and role of PT     Discussed current pool/land ex. Also using her electrical asst bike. She will continue    Will look into a LB support x    educ for results of cerv/head,a d lumbar MRI x    6/8/22 discussed doing shoulder isometrics in 3 different psoitions.      Supine: jen hip add/abd Using ball and blue band Hold 3-5 sec x 5 each   Seated : hip IR/ER 1x5 bilat    SL: hip abd, add 1x5 bilat     stand and lean on counter or side of [pool                        Hip ext- knee ext 1x10                       Mule kick ''                   Hip hydrant ''                                         Therapeutic Exercise: 30 min       Group Therapy:      Home Exercise Program:      Therapeutic Activity:

## 2022-06-13 ENCOUNTER — APPOINTMENT (OUTPATIENT)
Dept: PHYSICAL THERAPY | Age: 57
End: 2022-06-13
Payer: MEDICARE

## 2022-06-15 ENCOUNTER — APPOINTMENT (OUTPATIENT)
Dept: PHYSICAL THERAPY | Age: 57
End: 2022-06-15
Payer: MEDICARE

## 2022-06-22 ENCOUNTER — HOSPITAL ENCOUNTER (OUTPATIENT)
Dept: PHYSICAL THERAPY | Age: 57
Setting detail: THERAPIES SERIES
Discharge: HOME | End: 2022-06-22
Payer: MEDICARE

## 2022-06-22 PROCEDURE — 97140 MANUAL THERAPY 1/> REGIONS: CPT

## 2022-06-22 PROCEDURE — 97110 THERAPEUTIC EXERCISES: CPT

## 2022-06-22 NOTE — FLOWSHEET NOTE
[address] [place] [address] [facility] [address]  Phone [phone]                                                [x] Daily Treatment Note   [] Progress Note   [] Discharge Note      Date:  2022    Patient Name:  Khadar Hodges        :  1965                            Medical Diagnosis:  Back pain                                        ICD 10:  M54.9     Treatment Diagnosis:  Back pain and radicular leg pain    M54.50     Onset Date:    Dec 2021              Referral Date:22                   Referring Physician:  Dr. Riley Rosario                                                    Visits Allowed/Insurance/Certification Information:  Medicare and  for life      Plan of care signed (Y/N):      Progress report will be due (10 Rx or 30 days whichever is less):      Recertification will be due (POC Duration  / 90 days whichever is less):  22    Visit# / total visits:   Visit # Insurance Allowable Auth Required   In Person  3  medicare and tricar for life []  Yes     []  No    Tele Health 0  []  Yes     []  No    Total   3       Latex Allergy:  [x]NO      []YES    Pain level: /10     Subjective:  22 doing about the same. Every day she works on her core strength while standing, sitting, and lying down. She does her exercises in the pool and rides her bike all with in her tolerance. She continues to be very limited. R SI jt pain> L, central LBP  22 conts with the same complaints. 22 states she has had episodes of her back going out for 8 years, typically only 1 episode a years,  But since Dec she has had worse. She has had 6 episodes since . She has fallen 3x since mid dec.when her legs just gave out. No tripping on obstacles. She has had recent MRIs of her LB and brain. The chiari malformation is about the same.  Considerable deg changes in her LB but not stated as spina bifida occulta. That she has been dx with in the past. Over the winter she has been exercising in her pool and also has an Electrical asst  Bike that she rides 3x wk for 45 min. Other jt problems: R shoulder, L thumb, L hip, and R knee. At times her legs just give out. She has intermit R groin and ant thigh tingling. She is not able to go shopping or be on her feet at any time.     Pain       Patient describes pain to be  Constant LBP  Some towards L SI jt, but sometimes toward the R,. Patient reports    On normal days   2 /10 pain at rest, 7 /10 pain with activity/standing for 1 min. Worsened by  - stand/walk, sitting too low, or sitting with straight up posture and a good lordosis, pivot may make her back go out. Improved by - flexion, limited time on her feet. Pt. reports pain with coughing, sneezing and laughing:  Yes with coughing- she has to tighten up all her muscles with it  Pt. reports bowel and bladder changes:   NO   Current Functional Limitations: limited in all aspect of home mangement and social activities. PLOF:  ;limited like this for 1-2 years and progressively getting worse  Pt's sleep is affected? Yes. Can't lay on L hip or R shoulder do to those joints, then her LB issues.      Patient goal for therapy: try to stabilize my LB somehow.     Next visit:  Palpation ov lumbar spine in prone over pillows, check HEP, start ex on big ball, abdominals w/ red ball.     Exercises:   Exercise/Equipment Resistance/Repetitions Other comments   5/23/22 explailned course and role of PT     Discussed current pool/land ex. Also using her electrical asst bike. She will continue    Will look into a LB support x    educ for results of cerv/head,a d lumbar MRI x    6/8/22 discussed doing shoulder isometrics in 3 different psoitions.      Supine: jen hip add/abd Using ball and blue band Hold 3-5 sec x 5 each   Seated : hip IR/ER 1x5 bilat    SL: hip abd, add 1x5 bilat     stand and lean on counter or side of [pool                        Hip ext- knee ext 1x10                       Mule kick ''                   Hip hydrant ''    6/22/22 seated ball ex: shifts 2x10  Morecomfortable than a chair                                                        Therapeutic Exercise:  15 min       Group Therapy:      Home Exercise Program:      Therapeutic Activity:     Neuromuscular Re-education:      Gait:      Manual Therapy:   30min   MET For L SI jt mor quirino R. Resisted ab/add, MET for post L ilium, then resisted L abd/er for deep L sulcus. Very light bilat disstraction of lumbar spine thru the knees. spine for relief. Modalities:      Charges:  Timed Code Treatment Minutes:  60   Total Treatment Minutes:   60   BWC time for each procedure?:  TE TIME:  NMR TIME:  MANUAL TIME:  UNTIMED MINUTES:          [] EVAL (LOW) 88094 (typically 20 minutes face-to-face)  [] EVAL (MOD) 57024 (typically 30 minutes face-to-face)  [] EVAL (HIGH) 66584 (typically 45 minutes face-to-face)  [] RE-EVAL     [x] XZ(37367) x   2  [] IONTO  [] NMR (07631) x     [] VASO  [x] Manual (57110) x 2    [] Other:  [] TA x      [] Mech Traction (18293)  [] ES(attended) (96489)     [] ES (un) (07130):     Medicare Cap Total YTD:  350.00    Treatment/Activity Tolerance:    [x] Patient tolerated treatment well [] Patient limited by fatigue   [x] Patient limited by pain [] Patient limited by other medical complications   [] Other:     Prognosis: [x] Good [x] Fair  [] Poor    Patient Requires Follow-up:  [x] Yes  [] No    Plan: [] Continue per plan of care [] Alter current plan (see comments)   [x] Plan of care initiated [] Hold pending MD visit [] Discharge    Plan for Next Session:   Select LBP support, advance HEP    See Progress Note: [x] Yes  [] No       Next due:         Electronically signed by:  Jesse Palmer, [id number]  [facility]      Note: If patient does not return for scheduled/ recommended follow up visits, this note will serve as a discharge from care along with most recent update on progress.            Outpatient Physical Therapy  Progress Note      Progress Note covers period from:   5/23/22 To       Subjective:           Objective:   Observation:   Test measurements:       Functional Outcome Measure:            Assessment:   Summary:    Patient's response to treatment:      Goals:  · Progress toward previous goals:      Plan:  ·     Electronically Signed by:

## 2022-06-27 ENCOUNTER — HOSPITAL ENCOUNTER (OUTPATIENT)
Dept: PHYSICAL THERAPY | Age: 57
Setting detail: THERAPIES SERIES
Discharge: HOME | End: 2022-06-27
Payer: MEDICARE

## 2022-06-27 PROCEDURE — 97140 MANUAL THERAPY 1/> REGIONS: CPT

## 2022-06-27 PROCEDURE — 97110 THERAPEUTIC EXERCISES: CPT

## 2022-06-27 NOTE — FLOWSHEET NOTE
[address] [place] [address] [facility] [address]  Phone [phone]                                                [x] Daily Treatment Note   [] Progress Note   [] Discharge Note      Date:  2022    Patient Name:  Pasha Hayward        :  1965                            Medical Diagnosis:  Back pain                                        ICD 10:  M54.9     Treatment Diagnosis:  Back pain and radicular leg pain    M54.50     Onset Date:    Dec 2021              Referral Date:22                   Referring Physician:  Dr. Josiah Mason                                                    Visits Allowed/Insurance/Certification Information:  Medicare and  for life      Plan of care signed (Y/N):      Progress report will be due (10 Rx or 30 days whichever is less):      Recertification will be due (POC Duration  / 90 days whichever is less):  22    Visit# / total visits:   Visit # Insurance Allowable Auth Required   In Person  4  medicare and tricar for life []  Yes     []  No    Tele Health 0  []  Yes     []  No    Total    43       Latex Allergy:  [x]NO      []YES    Pain level: 2-10    prog note NV    Subjective:  22  Doing better with her back. 25% better. She has not been on her riding  so maybe that has helped. States her patella subluxes sometimes. The shoulders are just a little better from doing multi angle isometrics. 22 doing about the same. Every day she works on her core strength while standing, sitting, and lying down. She does her exercises in the pool and rides her bike all with in her tolerance. She continues to be very limited. R SI jt pain> L, central LBP  22 conts with the same complaints. 22 states she has had episodes of her back going out for 8 years, typically only 1 episode a years,  But since Dec she has had worse.   She has had 6 episodes since  2022. She has fallen 3x since mid dec.when her legs just gave out. No tripping on obstacles. She has had recent MRIs of her LB and brain. The chiari malformation is about the same. Considerable deg changes in her LB but not stated as spina bifida occulta. That she has been dx with in the past. Over the winter she has been exercising in her pool and also has an Electrical asst  Bike that she rides 3x wk for 45 min. Other jt problems: R shoulder, L thumb, L hip, and R knee. At times her legs just give out. She has intermit R groin and ant thigh tingling. She is not able to go shopping or be on her feet at any time.     Pain       Patient describes pain to be  Constant LBP  Some towards L SI jt, but sometimes toward the R,. Patient reports    On normal days   2 /10 pain at rest, 7 /10 pain with activity/standing for 1 min. Worsened by  - stand/walk, sitting too low, or sitting with straight up posture and a good lordosis, pivot may make her back go out. Improved by - flexion, limited time on her feet. Pt. reports pain with coughing, sneezing and laughing:  Yes with coughing- she has to tighten up all her muscles with it  Pt. reports bowel and bladder changes:   NO   Current Functional Limitations: limited in all aspect of home mangement and social activities. PLOF:  ;limited like this for 1-2 years and progressively getting worse  Pt's sleep is affected? Yes. Can't lay on L hip or R shoulder do to those joints, then her LB issues.      Patient goal for therapy: try to stabilize my LB somehow.     Next visit:  Palpation ov lumbar spine in prone over pillows, check HEP, start ex on big ball, abdominals w/ red ball.     Exercises:   Exercise/Equipment Resistance/Repetitions Other comments   5/23/22 explailned course and role of PT     Discussed current pool/land ex. Also using her electrical asst bike.  She will continue    Will look into a LB support x    educ for results of cerv/head,a d lumbar MRI x    6/8/22 discussed doing shoulder isometrics in 3 different psoitions. Supine: jen hip add/abd Using ball and blue band Hold 3-5 sec x 5 each   Seated : hip IR/ER 1x5 bilat    SL: hip abd, add 1x5 bilat     stand and lean on counter or side of [pool                        Hip ext- knee ext 1x10                       Mule kick ''                   Hip hydrant ''    6/22/22 seated ball ex: shifts, opp lifts, same lifts, kickpunch 2x10  Morecomfortable than a chair    6/27/22  Mid row, low row 5#  1x 10        adduction 5#  1x 10    stepups- lat. F.B   4 in  1x10      Multi angle quad sets Hold 5 sec. Therapeutic Exercise:  15 min   . Able to do some ex (I)    Group Therapy:      Home Exercise Program:      Therapeutic Activity:     Neuromuscular Re-education:      Gait:      Manual Therapy:    15min light manual lumbar distraction from behind her knees        spine for relief. Modalities:      Charges:  Timed Code Treatment Minutes:  30   Total Treatment Minutes:   60   BWC time for each procedure?:  TE TIME:  NMR TIME:  MANUAL TIME:  UNTIMED MINUTES:          [] EVAL (LOW) 26816 (typically 20 minutes face-to-face)  [] EVAL (MOD) 43543 (typically 30 minutes face-to-face)  [] EVAL (HIGH) 80626 (typically 45 minutes face-to-face)  [] RE-EVAL     [x] RM(27099) x   2  [] IONTO  [] NMR (28003) x     [] VASO  [x] Manual (48461) x 2    [] Other:  [] TA x      [] Mech Traction (13225)  [] ES(attended) (46746)     [] ES (un) (28184):     Medicare Cap Total YTD:  350.00    Treatment/Activity Tolerance:    [x] Patient tolerated treatment well [] Patient limited by fatigue   [x] Patient limited by pain [] Patient limited by other medical complications   [] Other:     Prognosis: [x] Good [x] Fair  [] Poor    Patient Requires Follow-up:  [x] Yes  [] No    Plan: [] Continue per plan of care [] Alter current plan (see comments)   [x] Plan of care initiated [] Hold pending MD visit [] Discharge    Plan for Next Session:   Select LBP support, advance HEP    See Progress Note: [x] Yes  [] No       Next due:         Electronically signed by:  Marilee Connell, [id number]  [facility]      Note: If patient does not return for scheduled/ recommended follow up visits, this note will serve as a discharge from care along with most recent update on progress.            Outpatient Physical Therapy  Progress Note      Progress Note covers period from:   5/23/22 To       Subjective:           Objective:   Observation:   Test measurements:       Functional Outcome Measure:            Assessment:   Summary:    Patient's response to treatment:      Goals:  · Progress toward previous goals:      Plan:  ·     Electronically Signed by:

## 2022-06-29 ENCOUNTER — APPOINTMENT (OUTPATIENT)
Dept: PHYSICAL THERAPY | Age: 57
End: 2022-06-29
Payer: MEDICARE

## 2022-07-08 ENCOUNTER — HOSPITAL ENCOUNTER (OUTPATIENT)
Dept: PHYSICAL THERAPY | Age: 57
Setting detail: THERAPIES SERIES
Discharge: HOME | End: 2022-07-08